# Patient Record
Sex: MALE | URBAN - METROPOLITAN AREA
[De-identification: names, ages, dates, MRNs, and addresses within clinical notes are randomized per-mention and may not be internally consistent; named-entity substitution may affect disease eponyms.]

---

## 2023-04-18 ENCOUNTER — OFFICE VISIT (OUTPATIENT)
Dept: PRIMARY CARE CLINIC | Age: 38
End: 2023-04-18
Payer: COMMERCIAL

## 2023-04-18 VITALS
WEIGHT: 184.2 LBS | HEIGHT: 68 IN | OXYGEN SATURATION: 99 % | BODY MASS INDEX: 27.92 KG/M2 | DIASTOLIC BLOOD PRESSURE: 78 MMHG | SYSTOLIC BLOOD PRESSURE: 122 MMHG | TEMPERATURE: 97.4 F | HEART RATE: 77 BPM

## 2023-04-18 DIAGNOSIS — K21.00 GASTROESOPHAGEAL REFLUX DISEASE WITH ESOPHAGITIS WITHOUT HEMORRHAGE: Chronic | ICD-10-CM

## 2023-04-18 DIAGNOSIS — K27.9 PUD (PEPTIC ULCER DISEASE): Chronic | ICD-10-CM

## 2023-04-18 PROCEDURE — G8419 CALC BMI OUT NRM PARAM NOF/U: HCPCS | Performed by: INTERNAL MEDICINE

## 2023-04-18 PROCEDURE — 1036F TOBACCO NON-USER: CPT | Performed by: INTERNAL MEDICINE

## 2023-04-18 PROCEDURE — 99203 OFFICE O/P NEW LOW 30 MIN: CPT | Performed by: INTERNAL MEDICINE

## 2023-04-18 PROCEDURE — G8427 DOCREV CUR MEDS BY ELIG CLIN: HCPCS | Performed by: INTERNAL MEDICINE

## 2023-04-18 RX ORDER — OMEPRAZOLE 20 MG/1
20 CAPSULE, DELAYED RELEASE ORAL DAILY
COMMUNITY

## 2023-04-18 RX ORDER — ESOMEPRAZOLE MAGNESIUM 40 MG/1
40 CAPSULE, DELAYED RELEASE ORAL
Qty: 30 CAPSULE | Refills: 5 | Status: SHIPPED | OUTPATIENT
Start: 2023-04-18

## 2023-04-18 SDOH — ECONOMIC STABILITY: INCOME INSECURITY: HOW HARD IS IT FOR YOU TO PAY FOR THE VERY BASICS LIKE FOOD, HOUSING, MEDICAL CARE, AND HEATING?: NOT HARD AT ALL

## 2023-04-18 SDOH — ECONOMIC STABILITY: HOUSING INSECURITY
IN THE LAST 12 MONTHS, WAS THERE A TIME WHEN YOU DID NOT HAVE A STEADY PLACE TO SLEEP OR SLEPT IN A SHELTER (INCLUDING NOW)?: NO

## 2023-04-18 SDOH — ECONOMIC STABILITY: FOOD INSECURITY: WITHIN THE PAST 12 MONTHS, YOU WORRIED THAT YOUR FOOD WOULD RUN OUT BEFORE YOU GOT MONEY TO BUY MORE.: NEVER TRUE

## 2023-04-18 SDOH — ECONOMIC STABILITY: FOOD INSECURITY: WITHIN THE PAST 12 MONTHS, THE FOOD YOU BOUGHT JUST DIDN'T LAST AND YOU DIDN'T HAVE MONEY TO GET MORE.: NEVER TRUE

## 2023-04-18 ASSESSMENT — PATIENT HEALTH QUESTIONNAIRE - PHQ9
SUM OF ALL RESPONSES TO PHQ9 QUESTIONS 1 & 2: 1
2. FEELING DOWN, DEPRESSED OR HOPELESS: 1
SUM OF ALL RESPONSES TO PHQ QUESTIONS 1-9: 1
1. LITTLE INTEREST OR PLEASURE IN DOING THINGS: 0

## 2023-04-18 NOTE — ASSESSMENT & PLAN NOTE
I discussed at length the etiology and pathology of GERD. Advised the patient of diet and lifestyle modification. Advised the patient to avoid foods like caffiene, carbonated drinks, acidic juices, cheese and diary products, spicy food, alchohol and fatty foods. Advised against cigarette smoking and consumption of alchohol. Advised against eating heavy meals. Advised against using water beds. Advised the importance of losing weight and avoidance of wearing tight clothing.   Will start ppi,

## 2023-04-18 NOTE — PROGRESS NOTES
to light. Nasal mucosa is normal. Throat is normal without exudates. Ears reveal normal tympanic membranes. Neck is supple and free of adenopathy, or masses. No thyromegaly. No jugular venous distension. Lungs are clear to auscultation, no rales or rhonchi noted. Heart sounds are regular , no murmurs, clicks, gallops or rubs. Abdomen is soft, no tenderness, masses or organomegaly. Bowel sounds are normally heard. Pelvis: normal. Extremities are normal. Peripheral pulses are normal. Screening neurological exam is normal without focal findings. Cranial nerves are intact, reflexes are symmetrical and muscle strength eaqual. Skin is normal without suspicious lesions noted. Assessment:      Gastroesophageal reflux disease with esophagitis without hemorrhage  I discussed at length the etiology and pathology of GERD. Advised the patient of diet and lifestyle modification. Advised the patient to avoid foods like caffiene, carbonated drinks, acidic juices, cheese and diary products, spicy food, alchohol and fatty foods. Advised against cigarette smoking and consumption of alchohol. Advised against eating heavy meals. Advised against using water beds. Advised the importance of losing weight and avoidance of wearing tight clothing. Will start ppi,     PUD (peptic ulcer disease)  Has hx of this, had hx of h pylori gastritis, which was treated in 2021,  Also had US and BW which was all normal,   Has been on prilosec prn,  Still has symptoms   Has abdominal pain and burning and bloating. Stop prilosec. Will start nexium,   May need EGD and GI w/u. Plan:      As above.          Flori Tineo MD

## 2023-04-18 NOTE — ASSESSMENT & PLAN NOTE
Has hx of this, had hx of h pylori gastritis, which was treated in 2021,  Also had US and BW which was all normal,   Has been on prilosec prn,  Still has symptoms   Has abdominal pain and burning and bloating. Stop prilosec. Will start nexium,   May need EGD and GI w/u.

## 2023-04-24 NOTE — TELEPHONE ENCOUNTER
Pt called stating that pharmacy said he would need pa for     esomeprazole ([employer]) 40 MG delayed release capsule [[id number]]     Pt stated that if there is something else that could be prescribed that's covered by insurance pt was unclear about what would be covered by insurance     Please reach out to pt       Thank you

## 2023-04-24 NOTE — TELEPHONE ENCOUNTER
We can try protonix and see if that is covered. I will send a script. I am not sure what is covered either.

## 2023-04-25 RX ORDER — OMEPRAZOLE 20 MG/1
20 CAPSULE, DELAYED RELEASE ORAL DAILY
Qty: 30 CAPSULE | Refills: 3 | Status: SHIPPED | OUTPATIENT
Start: 2023-04-25

## 2023-05-16 RX ORDER — OMEPRAZOLE 20 MG/1
20 CAPSULE, DELAYED RELEASE ORAL DAILY
Qty: 90 CAPSULE | Refills: 3 | Status: SHIPPED | OUTPATIENT
Start: 2023-05-16

## 2023-05-16 NOTE — TELEPHONE ENCOUNTER
Medication:   Requested Prescriptions      No prescriptions requested or ordered in this encounter     Last Filled:  04/25/2023    Last appt: 4/18/2023   Next appt: Visit date not found    Last OARRS: No flowsheet data found.